# Patient Record
Sex: MALE | Race: WHITE | NOT HISPANIC OR LATINO | Employment: OTHER | URBAN - METROPOLITAN AREA
[De-identification: names, ages, dates, MRNs, and addresses within clinical notes are randomized per-mention and may not be internally consistent; named-entity substitution may affect disease eponyms.]

---

## 2017-03-03 NOTE — PATIENT DISCUSSION
MODERATE DRY EYES: PRESCRIBED DISAPPEARING PRESERVATIVE OR PRESERVATIVE FREE ARTIFICIAL TEARS BID &ndash; QID4-6X A DAY, OU AND THE DAILY INTAKE OF OMEGA-3 DHA/EPA FATTY ACIDS TO HELP RELIEVE SYMPTOMS. ADD NIGHTLY LUBRICATING OINTMENT OR GEL.

## 2017-03-03 NOTE — PATIENT DISCUSSION
Continue: PreserVision AREDS 2 (vit c,d-kq-rvivo-lutein-zeaxan): capsule: 973-373-32-4 mg-unit-mg-mg

## 2017-03-03 NOTE — PATIENT DISCUSSION
STABLE NON-EXUDATIVE AMD: CONTINUE AREDS 2 VITAMINS / AMSLER GRID QD/ UV PROTECTION. SMOKING AVOIDANCE REVIEWED. RETURN FOR FOLLOW-UP AS SCHEDULED.

## 2017-09-05 NOTE — PATIENT DISCUSSION
Continue: PreserVision AREDS 2 (vit c,e-jw-alaju-lutein-zeaxan): capsule: 172-045-88-9 mg-unit-mg-mg

## 2017-09-05 NOTE — PATIENT DISCUSSION
Dry Eye Syndrome Counseling: I have discussed the diagnosis and the pathophysiology of this disease with the patient. Eyelid pathology and systemic illnesses such as Sjogren?s disease or rheumatoid arthritis may contribute to severity. Vision may be limited by dry eye, and symptoms exacerbated by environmental factors such as smoke, wind, or prolonged eye use. Lifestyle habits and environmental factors contributing to dry eyes have been reviewed with the patient. Daily prescribed and over the counter medications, along with their potential contributions to dry eye symptoms, have been discussed. Treatment options include, but are not limited to, artificial tears, punctal plugs, topical cyclosporine, oral omega-3 supplements, Lipiflow, moisture goggles, and lubricating ointments. I stressed the importance of compliance with treatment.

## 2017-10-25 NOTE — PATIENT DISCUSSION
VITREOMACULAR TRACTION, OS:  TX OPTIONS DISCUSSED. PATIENT ELECTS TO CONTINUE TO OBSERVE. RETURN AS SCHEDULED.

## 2017-10-25 NOTE — PATIENT DISCUSSION
Continue: PreserVision AREDS 2 (vit c,h-hn-gfrkq-lutein-zeaxan): capsule: 482-509-28-1 mg-unit-mg-mg

## 2017-10-25 NOTE — PATIENT DISCUSSION
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  RETINAL DETACHMENT SIGNS AND SYMPTOMS REVIEWED

## 2018-03-12 NOTE — PATIENT DISCUSSION
Continue: PreserVision AREDS 2 (vit c,c-xb-umcch-lutein-zeaxan): capsule: 685-921-18-0 mg-unit-mg-mg

## 2018-03-15 NOTE — PATIENT DISCUSSION
1.  PCO  OD (Posterior Capsule Opacification)   PCO is visually significant and impairment of vision does not meet the patient’s functional needs or interferes with activities of daily living. Risks benefits and alternatives to the Nd:YAG Laser reviewed including elevated IOP immediately postop and retinal tear/detachment. Patient to notify their ophthalmologist promptly if they have a significant change in symptoms such as flashes of light (photopsia) an increase in floaters loss of visual field or decrease in visual acuity after the procedure. Patient will be scheduled in [place] [address] for Nd:YAG Laser. 2. PCO  OS: (Posterior Capsule Opacification)  Not visually significant at this time. Monitor for yag capsulotomy necessity. 3. Pseudophakia OU - IOLs stable. Monitor. 4. Dry Eye OU:  Continue current management with Artificial Tears.

## 2018-04-25 NOTE — PATIENT DISCUSSION
Continue: PreserVision AREDS 2 (vit c,p-vt-dnqml-lutein-zeaxan): capsule: 908-750-23-0 mg-unit-mg-mg

## 2018-04-25 NOTE — PATIENT DISCUSSION
Vitreomacular Traction (VMT) syndrome is an eye condition involving the vitreous, the clear gel that fills the inside of the eyeball. It is a vision-threatening condition and can cause symptoms ranging from blurry vision to distorted and blacked-out central vision. Most patients who have VMT syndrome experience some difficulty doing daily tasks that require sharp vision, such as reading or watching TV. I have further explained not all patients who develop traction have notable symptoms, therefore, compliance with return visits are necessary. The patient was instructed to contact us immediately if they noticed any of the signs or symptoms as noted above as the prognosis for any potential treatment options may be time related. Return for follow-up as scheduled.

## 2018-05-07 ENCOUNTER — NEW REFERRAL (OUTPATIENT)
Dept: URBAN - METROPOLITAN AREA CLINIC 26 | Facility: CLINIC | Age: 68
End: 2018-05-07

## 2018-05-07 VITALS
HEART RATE: 74 BPM | BODY MASS INDEX: 27.3 KG/M2 | DIASTOLIC BLOOD PRESSURE: 63 MMHG | HEIGHT: 71 IN | SYSTOLIC BLOOD PRESSURE: 106 MMHG | WEIGHT: 195 LBS

## 2018-05-07 DIAGNOSIS — H02.836: ICD-10-CM

## 2018-05-07 DIAGNOSIS — H04.123: ICD-10-CM

## 2018-05-07 DIAGNOSIS — D31.31: ICD-10-CM

## 2018-05-07 DIAGNOSIS — H43.393: ICD-10-CM

## 2018-05-07 DIAGNOSIS — H25.9: ICD-10-CM

## 2018-05-07 DIAGNOSIS — H35.3121: ICD-10-CM

## 2018-05-07 DIAGNOSIS — D31.32: ICD-10-CM

## 2018-05-07 DIAGNOSIS — H02.833: ICD-10-CM

## 2018-05-07 PROCEDURE — 92225 OPHTHALMOSCOPY (INITIAL): CPT

## 2018-05-07 PROCEDURE — 99204 OFFICE O/P NEW MOD 45 MIN: CPT

## 2018-05-07 PROCEDURE — 92250 FUNDUS PHOTOGRAPHY W/I&R: CPT

## 2018-05-07 ASSESSMENT — VISUAL ACUITY
OD_CC: 20/25+1
OS_SC: 20/60-1
OD_SC: 20/200+2
OS_CC: 20/20

## 2018-05-07 ASSESSMENT — TONOMETRY
OD_IOP_MMHG: 12
OS_IOP_MMHG: 11

## 2018-10-31 NOTE — PATIENT DISCUSSION
Continue: PreserVision AREDS 2 (vit c,d-pf-ohyin-lutein-zeaxan): capsule: 658-388-88-1 mg-unit-mg-mg

## 2019-03-15 NOTE — PATIENT DISCUSSION
VITREOMACULAR TRACTION, OS: TX OPTIONS DISCUSSED. PATIENT ELECTS TO CONTINUE TO OBSERVE. CONTINUE F/U WITH DR Terrence Caldera.

## 2019-03-15 NOTE — PATIENT DISCUSSION
Continue: PreserVision AREDS 2 (vit c,m-ob-tuuvj-lutein-zeaxan): capsule: 269-272-67-7 mg-unit-mg-mg

## 2019-05-01 NOTE — PATIENT DISCUSSION
Continue: PreserVision AREDS 2 (vit c,j-ua-drrwf-lutein-zeaxan): capsule: 996-248-39-3 mg-unit-mg-mg

## 2019-05-21 ENCOUNTER — FOLLOW UP (OUTPATIENT)
Dept: URBAN - METROPOLITAN AREA CLINIC 26 | Facility: CLINIC | Age: 69
End: 2019-05-21

## 2019-05-21 VITALS — HEIGHT: 71 IN | BODY MASS INDEX: 26.6 KG/M2 | WEIGHT: 190 LBS

## 2019-05-21 DIAGNOSIS — H04.123: ICD-10-CM

## 2019-05-21 DIAGNOSIS — D31.31: ICD-10-CM

## 2019-05-21 DIAGNOSIS — H02.836: ICD-10-CM

## 2019-05-21 DIAGNOSIS — H02.833: ICD-10-CM

## 2019-05-21 DIAGNOSIS — H35.3121: ICD-10-CM

## 2019-05-21 DIAGNOSIS — H43.393: ICD-10-CM

## 2019-05-21 DIAGNOSIS — D31.32: ICD-10-CM

## 2019-05-21 DIAGNOSIS — H25.9: ICD-10-CM

## 2019-05-21 PROCEDURE — 92014 COMPRE OPH EXAM EST PT 1/>: CPT

## 2019-05-21 PROCEDURE — 92250 FUNDUS PHOTOGRAPHY W/I&R: CPT

## 2019-05-21 PROCEDURE — 92134 CPTRZ OPH DX IMG PST SGM RTA: CPT

## 2019-05-21 ASSESSMENT — VISUAL ACUITY
OD_CC: 20/20-1
OS_CC: 20/20

## 2019-05-21 ASSESSMENT — TONOMETRY
OS_IOP_MMHG: 10
OD_IOP_MMHG: 11

## 2019-05-24 NOTE — PATIENT DISCUSSION
Continue: PreserVision AREDS 2 (vit c,s-tu-vwyii-lutein-zeaxan): capsule: 725-929-10-2 mg-unit-mg-mg Satisfactory

## 2019-11-27 NOTE — PATIENT DISCUSSION
Continue: PreserVision AREDS 2 (vit c,e-ka-xguzk-lutein-zeaxan): capsule: 294-113-67-8 mg-unit-mg-mg

## 2020-05-26 ENCOUNTER — FOLLOW UP (OUTPATIENT)
Dept: URBAN - METROPOLITAN AREA CLINIC 26 | Facility: CLINIC | Age: 70
End: 2020-05-26

## 2020-05-26 VITALS — HEIGHT: 71 IN | BODY MASS INDEX: 26.6 KG/M2 | WEIGHT: 190 LBS

## 2020-05-26 DIAGNOSIS — H35.3121: ICD-10-CM

## 2020-05-26 DIAGNOSIS — H43.393: ICD-10-CM

## 2020-05-26 DIAGNOSIS — D31.32: ICD-10-CM

## 2020-05-26 DIAGNOSIS — D31.31: ICD-10-CM

## 2020-05-26 PROCEDURE — 92134 CPTRZ OPH DX IMG PST SGM RTA: CPT

## 2020-05-26 PROCEDURE — 92014 COMPRE OPH EXAM EST PT 1/>: CPT

## 2020-05-26 PROCEDURE — 92250 FUNDUS PHOTOGRAPHY W/I&R: CPT

## 2020-05-26 ASSESSMENT — VISUAL ACUITY
OS_CC: 20/20-1
OD_CC: 20/30-2

## 2020-05-26 ASSESSMENT — TONOMETRY
OS_IOP_MMHG: 12
OD_IOP_MMHG: 14

## 2020-05-27 NOTE — PATIENT DISCUSSION
Continue: PreserVision AREDS 2 (vit c,s-op-fzuro-lutein-zeaxan): capsule: 408-979-21-0 mg-unit-mg-mg

## 2020-09-01 NOTE — PATIENT DISCUSSION
VITREOMACULAR TRACTION, OS: TX OPTIONS DISCUSSED. PATIENT ELECTS TO CONTINUE TO OBSERVE. CONTINUE F/U WITH DR Radha Barr.

## 2020-09-01 NOTE — PATIENT DISCUSSION
Continue: PreserVision AREDS 2 (vit c,g-tl-jkkuj-lutein-zeaxan): capsule: 717-245-00-7 mg-unit-mg-mg

## 2021-05-26 NOTE — PATIENT DISCUSSION
Continue: PreserVision AREDS 2 (vit c,p-wg-iobfx-lutein-zeaxan): capsule: 590-756-91-7 mg-unit-mg-mg

## 2021-06-03 ENCOUNTER — FOLLOW UP (OUTPATIENT)
Dept: URBAN - METROPOLITAN AREA CLINIC 26 | Facility: CLINIC | Age: 71
End: 2021-06-03

## 2021-06-03 VITALS — BODY MASS INDEX: 27.2 KG/M2 | HEIGHT: 70 IN | WEIGHT: 190 LBS

## 2021-06-03 DIAGNOSIS — H35.3121: ICD-10-CM

## 2021-06-03 DIAGNOSIS — D31.32: ICD-10-CM

## 2021-06-03 DIAGNOSIS — H43.393: ICD-10-CM

## 2021-06-03 DIAGNOSIS — D31.31: ICD-10-CM

## 2021-06-03 PROCEDURE — 92014 COMPRE OPH EXAM EST PT 1/>: CPT

## 2021-06-03 PROCEDURE — 92134 CPTRZ OPH DX IMG PST SGM RTA: CPT

## 2021-06-03 PROCEDURE — 92250 FUNDUS PHOTOGRAPHY W/I&R: CPT

## 2021-06-03 ASSESSMENT — TONOMETRY
OD_IOP_MMHG: 16
OS_IOP_MMHG: 16

## 2021-06-03 ASSESSMENT — VISUAL ACUITY
OS_CC: 20/20
OD_CC: 20/30-2

## 2021-09-14 NOTE — PATIENT DISCUSSION
VITREOMACULAR TRACTION, OS: TX OPTIONS DISCUSSED. PATIENT ELECTS TO CONTINUE TO OBSERVE. CONTINUE F/U WITH DR Cheyenne Watkins.

## 2021-09-14 NOTE — PATIENT DISCUSSION
Continue: PreserVision AREDS 2 (vit c,n-ll-ztlyr-lutein-zeaxan): capsule: 557-863-55-1 mg-unit-mg-mg

## 2022-03-22 NOTE — PATIENT DISCUSSION
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 Quality 111:Pneumonia Vaccination Status For Older Adults: Pneumococcal Vaccination Previously Received Quality 47: Advance Care Plan: Advance care planning not documented, reason not otherwise specified. Quality 134: Screening For Clinical Depression And Follow-Up Plan: The patient was screened for depression and the screen was negative and no follow up required Quality 226: Preventive Care And Screening: Tobacco Use: Screening And Cessation Intervention: Patient screened for tobacco use and is an ex/non-smoker Quality 431: Preventive Care And Screening: Unhealthy Alcohol Use - Screening: Patient not identified as an unhealthy alcohol user when screened for unhealthy alcohol use using a systematic screening method Quality 130: Documentation Of Current Medications In The Medical Record: Current Medications Documented Quality 110: Preventive Care And Screening: Influenza Immunization: Influenza immunization was not ordered or administered, reason not given Detail Level: Detailed

## 2022-08-17 ENCOUNTER — FOLLOW UP (OUTPATIENT)
Dept: URBAN - METROPOLITAN AREA CLINIC 26 | Facility: CLINIC | Age: 72
End: 2022-08-17

## 2022-08-17 VITALS — BODY MASS INDEX: 24.92 KG/M2 | WEIGHT: 184 LBS | HEIGHT: 72 IN

## 2022-08-17 DIAGNOSIS — H04.123: ICD-10-CM

## 2022-08-17 DIAGNOSIS — H43.393: ICD-10-CM

## 2022-08-17 DIAGNOSIS — D31.32: ICD-10-CM

## 2022-08-17 DIAGNOSIS — H35.3121: ICD-10-CM

## 2022-08-17 DIAGNOSIS — D31.31: ICD-10-CM

## 2022-08-17 PROCEDURE — 92250 FUNDUS PHOTOGRAPHY W/I&R: CPT

## 2022-08-17 PROCEDURE — 92014 COMPRE OPH EXAM EST PT 1/>: CPT

## 2022-08-17 PROCEDURE — 92134 CPTRZ OPH DX IMG PST SGM RTA: CPT

## 2022-08-17 ASSESSMENT — VISUAL ACUITY
OS_CC: 20/20
OD_CC: 20/40-1
OD_PH: 20/25-2

## 2022-08-17 ASSESSMENT — TONOMETRY
OD_IOP_MMHG: 14
OS_IOP_MMHG: 12

## 2023-01-19 ENCOUNTER — COMPREHENSIVE EXAM (OUTPATIENT)
Dept: URBAN - METROPOLITAN AREA CLINIC 27 | Facility: CLINIC | Age: 73
End: 2023-01-19

## 2023-01-19 DIAGNOSIS — H25.9: ICD-10-CM

## 2023-01-19 DIAGNOSIS — E11.9: ICD-10-CM

## 2023-01-19 DIAGNOSIS — D31.32: ICD-10-CM

## 2023-01-19 DIAGNOSIS — D31.31: ICD-10-CM

## 2023-01-19 PROCEDURE — 92004 COMPRE OPH EXAM NEW PT 1/>: CPT

## 2023-01-19 PROCEDURE — 92015 DETERMINE REFRACTIVE STATE: CPT

## 2023-01-19 PROCEDURE — 92202 OPSCPY EXTND ON/MAC DRAW: CPT

## 2023-01-19 ASSESSMENT — TONOMETRY
OS_IOP_MMHG: 15
OD_IOP_MMHG: 15

## 2023-01-19 ASSESSMENT — VISUAL ACUITY
OS_CC: J1+
OD_CC: 20/30
OD_CC: J1+
OS_SC: 20/60
OD_SC: 20/80
OS_CC: 20/25

## 2023-08-18 ENCOUNTER — COMPREHENSIVE EXAM (OUTPATIENT)
Dept: URBAN - METROPOLITAN AREA CLINIC 26 | Facility: CLINIC | Age: 73
End: 2023-08-18

## 2023-08-18 VITALS — HEIGHT: 71.5 IN | WEIGHT: 177 LBS | BODY MASS INDEX: 24.24 KG/M2

## 2023-08-18 DIAGNOSIS — D31.32: ICD-10-CM

## 2023-08-18 DIAGNOSIS — H04.123: ICD-10-CM

## 2023-08-18 DIAGNOSIS — H35.3121: ICD-10-CM

## 2023-08-18 DIAGNOSIS — D31.31: ICD-10-CM

## 2023-08-18 DIAGNOSIS — H43.393: ICD-10-CM

## 2023-08-18 PROCEDURE — 92250 FUNDUS PHOTOGRAPHY W/I&R: CPT

## 2023-08-18 PROCEDURE — 92134 CPTRZ OPH DX IMG PST SGM RTA: CPT

## 2023-08-18 PROCEDURE — 92014 COMPRE OPH EXAM EST PT 1/>: CPT

## 2023-08-18 ASSESSMENT — TONOMETRY
OD_IOP_MMHG: 12
OS_IOP_MMHG: 12

## 2023-08-18 ASSESSMENT — VISUAL ACUITY
OS_CC: 20/20
OD_PH: 20/20-1
OD_CC: 20/30

## 2024-08-20 ENCOUNTER — COMPREHENSIVE EXAM (OUTPATIENT)
Dept: URBAN - METROPOLITAN AREA CLINIC 26 | Facility: CLINIC | Age: 74
End: 2024-08-20

## 2024-08-20 VITALS — HEIGHT: 72 IN | WEIGHT: 185 LBS | BODY MASS INDEX: 25.06 KG/M2

## 2024-08-20 DIAGNOSIS — H35.3121: ICD-10-CM

## 2024-08-20 DIAGNOSIS — H04.123: ICD-10-CM

## 2024-08-20 DIAGNOSIS — H43.393: ICD-10-CM

## 2024-08-20 DIAGNOSIS — D31.32: ICD-10-CM

## 2024-08-20 DIAGNOSIS — D31.31: ICD-10-CM

## 2024-08-20 DIAGNOSIS — H02.833: ICD-10-CM

## 2024-08-20 DIAGNOSIS — H25.9: ICD-10-CM

## 2024-08-20 DIAGNOSIS — H02.836: ICD-10-CM

## 2024-08-20 PROCEDURE — 92250 FUNDUS PHOTOGRAPHY W/I&R: CPT | Mod: 59

## 2024-08-20 PROCEDURE — 92014 COMPRE OPH EXAM EST PT 1/>: CPT

## 2024-08-20 PROCEDURE — 92134 CPTRZ OPH DX IMG PST SGM RTA: CPT

## 2024-08-20 ASSESSMENT — VISUAL ACUITY
OS_CC: 20/15-1
OD_CC: 20/20-2

## 2024-08-20 ASSESSMENT — TONOMETRY
OS_IOP_MMHG: 11
OD_IOP_MMHG: 11

## 2025-07-22 NOTE — PATIENT DISCUSSION
STABLE NON-EXUDATIVE AMD, OU:  CONTINUE AREDS 2 VITAMINS / AMSLER GRID QD/ UV PROTECTION. SMOKING AVOIDANCE REVIEWED. RETURN FOR FOLLOW-UP AS SCHEDULED. Were fmla forms received? Please call him [phone] . He knows Dr Garrido out until next TUESDAY.